# Patient Record
Sex: FEMALE | Race: WHITE
[De-identification: names, ages, dates, MRNs, and addresses within clinical notes are randomized per-mention and may not be internally consistent; named-entity substitution may affect disease eponyms.]

---

## 2018-07-30 ENCOUNTER — HOSPITAL ENCOUNTER (EMERGENCY)
Dept: HOSPITAL 63 - ER | Age: 38
Discharge: HOME | End: 2018-07-30
Payer: OTHER GOVERNMENT

## 2018-07-30 VITALS — WEIGHT: 160 LBS | BODY MASS INDEX: 25.71 KG/M2 | HEIGHT: 66 IN

## 2018-07-30 VITALS — DIASTOLIC BLOOD PRESSURE: 61 MMHG | SYSTOLIC BLOOD PRESSURE: 142 MMHG
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
## 2018-07-30 DIAGNOSIS — S50.02XA: Primary | ICD-10-CM

## 2018-07-30 PROCEDURE — 99284 EMERGENCY DEPT VISIT MOD MDM: CPT

## 2018-07-30 PROCEDURE — 73080 X-RAY EXAM OF ELBOW: CPT

## 2018-07-30 NOTE — ED.ADGEN
Past History


Past Medical History:  No Pertinent History


Past Surgical History:  No Surgical History


Smoking:  Non-smoker


Drug Use:  None





Adult General


Chief Complaint


Chief Complaint


Left elbow injury post-fall





HPI


HPI


Patient tripped over a cat while going up stairs at 6:30 AM this morning 

landing on her left elbow with injury. She notes no other injury. She has 

tenderness at her left elbow though she has intact range of motion.





Review of Systems


Review of Systems





Constitutional: Denies fever or chills 


Eyes: Denies change in visual acuity, redness, or eye pain 


HENT: Denies nasal congestion or sore throat 


Respiratory: Denies cough or shortness of breath 


Cardiovascular: Denies chest pain


GI: Denies abdominal pain, nausea, vomiting, bloody stools or diarrhea 


: Denies dysuria or hematuria 


Musculoskeletal: Denies back pain, with left elbow tenderness, no swelling or 

ecchymosis intact distal neurovascular exam is intact 


Integument: Denies rash or skin lesions 


Neurologic: Denies headache, focal weakness or sensory changes 


Endocrine: Denies polyuria or polydipsia 





All other systems were reviewed and found to be within normal limits, except as 

documented in this note.





Allergies


Allergies





Allergies








Coded Allergies Type Severity Reaction Last Updated Verified


 


  No Known Drug Allergies    18 No











Physical Exam


Physical Exam





Constitutional: Well developed, well nourished, no acute distress, non-toxic 

appearance. 


HENT: Normocephalic, atraumatic, bilateral external ears normal, oropharynx 

moist, no oral exudates, nose normal. 


Eyes: PERRLA, EOMI, conjunctiva normal, no discharge.  


Neck: Normal range of motion, no tenderness, supple, no stridor. 


Cardiovascular:Heart rate regular rhythm, no murmur 


Lungs & Thorax:  Bilateral breath sounds clear to auscultation 


Abdomen: Bowel sounds normal, soft, no tenderness, no masses, no pulsatile 

masses. 


Skin: Warm, dry, no erythema, no rash. 


Back: No tenderness, no CVA tenderness. 


Extremities: With left elbow tenderness along the olecranon, no swelling, no 

ecchymosis, no cyanosis, no clubbing, ROM intact, no edema. 


Neurologic: Alert and oriented X 3, normal motor function, normal sensory 

function, no focal deficits noted. Distal sensation of left upper extremity is 

intact to light touch and position sense. Left hand has intact range of motion 

with normal thumb finger apposition.


Psychologic: Affect normal, judgement normal, mood normal.





Current Patient Data


Vital Signs





 Vital Signs








  Date Time  Temp Pulse Resp B/P (MAP) Pulse Ox O2 Delivery O2 Flow Rate FiO2


 


18 07:20 97.9 74 18  98 Room Air  











EKG


EKG


[]





Radiology/Procedures


Radiology/Procedures


 [facility] [address], [place], [place] [id number] [phone]


 


 IMAGING REPORT





 Signed





PATIENT: SUMAN MOY ACCOUNT: [id number] MRN#: [id number]


: 1980 LOCATION: ER AGE: 38


SEX: F EXAM DT: 18 ACCESSION#: [id number]


STATUS: REG ER ORD. PHYSICIAN: ARLETH NAGEL MD 


REASON: pain/fall


PROCEDURE: ELBOW LEFT 3V





Left elbow, 3 views, 2018:


 


HISTORY: Elbow injury after a fall


 


No fracture or dislocation is identified. There is no radiographic 


evidence of a joint effusion.


 


IMPRESSION: No significant left elbow abnormality is detected.


 


Electronically signed by: Rick Moritz, MD (2018 7:52 AM) [facility]














DICTATED AND SIGNED BY:     MORITZ,RICK S MD


DATE:     18 0752





CC: ARLETH NAGEL MD; PCP,NO ~





Course & Med Decision Making


Course & Med Decision Making


Patient presents with left elbow injury post-fall


DDx-fracture, contusion, dislocation, sprain





Patient was stable in the emergency department. DNVI. Elbow x-ray was 

unremarkable. No fracture or dislocation.


Patient was given her x-ray results and advised follow with her primary doctor. 

Patient given prescription of Motrin for pain





Final Impression


Final Impression


Clinical impression


Left elbow contusion





Dragon Disclaimer


Dragon Disclaimer


This electronic medical record was generated, in whole or in part, using a 

voice recognition dictation system.





Departure


Departure:


Impression:  


 Primary Impression:  


 Left elbow contusion


Disposition:  01 HOME, SELF-CARE


Condition:  STABLE


Referrals:  


RALF MANRIQUEZ MD


Follow-up in 2 days for further evaluation


Patient Instructions:  Elbow Contusion, Easy-to-Read





Additional Instructions:  


Follow-up with your primary doctor for further evaluation. If you develop worse 

pain, swelling, weakness, numbness return to the emergency department.


Scripts


Ibuprofen (IBUPROFEN) 800 Mg Tablet


1 TAB PO TID, #15 TAB


   Prov: ARLETH NAGEL MD         18











ARLETH NAGEL MD 2018 07:24

## 2018-07-30 NOTE — RAD
Left elbow, 3 views, 7/30/2018:

 

HISTORY: Elbow injury after a fall

 

No fracture or dislocation is identified. There is no radiographic 

evidence of a joint effusion.

 

IMPRESSION: No significant left elbow abnormality is detected.

 

Electronically signed by: Rick Moritz, MD (7/30/2018 7:52 AM) [facility]

## 2019-08-30 ENCOUNTER — HOSPITAL ENCOUNTER (EMERGENCY)
Dept: HOSPITAL 63 - ER | Age: 39
Discharge: HOME | End: 2019-08-30
Payer: OTHER GOVERNMENT

## 2019-08-30 VITALS — DIASTOLIC BLOOD PRESSURE: 57 MMHG | SYSTOLIC BLOOD PRESSURE: 106 MMHG

## 2019-08-30 VITALS — WEIGHT: 158 LBS | BODY MASS INDEX: 25.39 KG/M2 | HEIGHT: 66 IN

## 2019-08-30 DIAGNOSIS — K52.9: Primary | ICD-10-CM

## 2019-08-30 LAB
ALBUMIN SERPL-MCNC: 3.5 G/DL (ref 3.4–5)
ALBUMIN/GLOB SERPL: 0.9 {RATIO} (ref 1–1.7)
ALP SERPL-CCNC: 73 U/L (ref 46–116)
ALT SERPL-CCNC: 17 U/L (ref 14–59)
AMORPH SED URNS QL MICRO: PRESENT /HPF
ANION GAP SERPL CALC-SCNC: 6 MMOL/L (ref 6–14)
APTT PPP: YELLOW S
AST SERPL-CCNC: 20 U/L (ref 15–37)
BACTERIA #/AREA URNS HPF: (no result) /HPF
BASOPHILS # BLD AUTO: 0 X10^3/UL (ref 0–0.2)
BASOPHILS NFR BLD: 1 % (ref 0–3)
BILIRUB SERPL-MCNC: 0.4 MG/DL (ref 0.2–1)
BILIRUB UR QL STRIP: (no result)
BUN/CREAT SERPL: 8 (ref 6–20)
CA-I SERPL ISE-MCNC: 7 MG/DL (ref 7–20)
CALCIUM SERPL-MCNC: 8.6 MG/DL (ref 8.5–10.1)
CHLORIDE SERPL-SCNC: 105 MMOL/L (ref 98–107)
CO2 SERPL-SCNC: 27 MMOL/L (ref 21–32)
CREAT SERPL-MCNC: 0.9 MG/DL (ref 0.6–1)
EOSINOPHIL NFR BLD: 0.1 X10^3/UL (ref 0–0.7)
EOSINOPHIL NFR BLD: 1 % (ref 0–3)
ERYTHROCYTE [DISTWIDTH] IN BLOOD BY AUTOMATED COUNT: 12.6 % (ref 11.5–14.5)
FIBRINOGEN PPP-MCNC: (no result) MG/DL
GFR SERPLBLD BASED ON 1.73 SQ M-ARVRAT: 70.1 ML/MIN
GLOBULIN SER-MCNC: 3.8 G/DL (ref 2.2–3.8)
GLUCOSE SERPL-MCNC: 95 MG/DL (ref 70–99)
GLUCOSE UR STRIP-MCNC: (no result) MG/DL
HCT VFR BLD CALC: 41.4 % (ref 36–47)
HGB BLD-MCNC: 14 G/DL (ref 12–15.5)
LYMPHOCYTES # BLD: 1.6 X10^3/UL (ref 1–4.8)
LYMPHOCYTES NFR BLD AUTO: 28 % (ref 24–48)
MCH RBC QN AUTO: 32 PG (ref 25–35)
MCHC RBC AUTO-ENTMCNC: 34 G/DL (ref 31–37)
MCV RBC AUTO: 95 FL (ref 79–100)
MONO #: 0.8 X10^3/UL (ref 0–1.1)
MONOCYTES NFR BLD: 14 % (ref 0–9)
NEUT #: 3.4 X10^3UL (ref 1.8–7.7)
NEUTROPHILS NFR BLD AUTO: 57 % (ref 31–73)
NITRITE UR QL STRIP: (no result)
PLATELET # BLD AUTO: 251 X10^3/UL (ref 140–400)
POTASSIUM SERPL-SCNC: 3.5 MMOL/L (ref 3.5–5.1)
PROT SERPL-MCNC: 7.3 G/DL (ref 6.4–8.2)
RBC # BLD AUTO: 4.38 X10^6/UL (ref 3.5–5.4)
RBC #/AREA URNS HPF: (no result) /HPF (ref 0–2)
SODIUM SERPL-SCNC: 138 MMOL/L (ref 136–145)
SP GR UR STRIP: 1.01
SQUAMOUS #/AREA URNS LPF: (no result) /LPF
UROBILINOGEN UR-MCNC: 0.2 MG/DL
WBC # BLD AUTO: 6 X10^3/UL (ref 4–11)
WBC #/AREA URNS HPF: (no result) /HPF (ref 0–4)

## 2019-08-30 PROCEDURE — 74177 CT ABD & PELVIS W/CONTRAST: CPT

## 2019-08-30 PROCEDURE — 81025 URINE PREGNANCY TEST: CPT

## 2019-08-30 PROCEDURE — 87086 URINE CULTURE/COLONY COUNT: CPT

## 2019-08-30 PROCEDURE — 36415 COLL VENOUS BLD VENIPUNCTURE: CPT

## 2019-08-30 PROCEDURE — 85025 COMPLETE CBC W/AUTO DIFF WBC: CPT

## 2019-08-30 PROCEDURE — 99285 EMERGENCY DEPT VISIT HI MDM: CPT

## 2019-08-30 PROCEDURE — 81001 URINALYSIS AUTO W/SCOPE: CPT

## 2019-08-30 PROCEDURE — 80053 COMPREHEN METABOLIC PANEL: CPT

## 2019-08-30 PROCEDURE — 96374 THER/PROPH/DIAG INJ IV PUSH: CPT

## 2019-08-30 NOTE — PHYS DOC
Past History


Past Medical History:  No Pertinent History


Past Surgical History:  No Surgical History


Smoking:  Non-smoker


Alcohol Use:  None


Drug Use:  None





Adult General


Chief Complaint


Chief Complaint:  NAUSEA/VOMITING/DIARRHEA





HPI


HPI


39-year-old female presents with lower abdominal pain and diarrhea. The patient 

started with diarrhea 4 days ago. She seems to have an episode every time she 

eats or drinks anything. He has had decreased appetite. On the second day, she 

started to have some lower abdominal cramping.  Her stool has not been bloody. 

She has not measured a fever, but has had chills. No history of diverticulosis 

or diverticulitis. 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 

currently, and was born with 4. The patient has had nausea, but no vomiting.





Review of Systems


Review of Systems





Constitutional: Denies fever or chills []


Eyes: Denies change in visual acuity, redness, or eye pain []


HENT: Denies nasal congestion or sore throat []


Respiratory: Denies cough or shortness of breath []


Cardiovascular: No additional information not addressed in HPI []


GI: abdominal pain, nausea, diarrhea.  []


: Denies dysuria or hematuria []


Musculoskeletal: Denies back pain or joint pain []


Integument: Denies rash or skin lesions []


Neurologic: Denies headache, focal weakness or sensory changes []


Endocrine: Denies polyuria or polydipsia []





All other systems were reviewed and found to be within normal limits, except as 

documented in this note.





Current Medications


Current Medications





Current Medications








 Medications


  (Trade)  Dose


 Ordered  [facility]  Start Time


 Stop Time Status Last Admin


Dose Admin


 


 Ondansetron HCl


  (Zofran)  4 mg  1X  ONCE  8/30/19 12:30


 8/30/19 12:31 DC  





 


 Sodium Chloride  1,000 ml @ 


 1,000 mls/hr  1X  ONCE  8/30/19 12:30


 8/30/19 13:29   














Allergies


Allergies





Allergies








Coded Allergies Type Severity Reaction Last Updated Verified


 


  No Known Drug Allergies    7/30/18 No











Physical Exam


Physical Exam





Constitutional: Well developed, well nourished, no acute distress, non-toxic pablo

earance. []


HENT: Normocephalic, atraumatic, bilateral external ears normal, oropharynx 

moist, no oral exudates, nose normal. []


Eyes: PERRLA, EOMI, conjunctiva normal, no discharge. [] 


Neck: Normal range of motion, no tenderness, supple, no stridor. [] 


Cardiovascular:Heart rate regular rhythm, no murmur []


Lungs & Thorax:  Bilateral breath sounds clear to auscultation []


Abdomen: Bowel sounds normal, soft, mild right lower quadrant and left lower 

quadrant tenderness, no masses, no pulsatile masses. [] 


Skin: Warm, dry, no erythema, no rash. [] 


Back: No tenderness, no CVA tenderness. [] 


Extremities: No tenderness, no cyanosis, no clubbing, ROM intact, no edema. [] 


Neurologic: Alert and oriented X 3, normal motor function, normal sensory 

function, no focal deficits noted. []


Psychologic: Affect normal, judgement normal, mood normal. []





EKG


EKG


[]





Radiology/Procedures


Radiology/Procedures


[]


Impressions:


CT ABD PELV W/ IV CONTRST ONLY


 


Indication: Lower abdominal pain.


 


Exposure: One or more of the following individualized dose reduction 


techniques were utilized for this examination:  1. Automated exposure 


control  2. Adjustment of the mA and/or kV according to patient size  3. 


Use of iterative reconstruction technique.


 


Technique: Intravenous contrast was given. No oral contrast per request.


 


Comparison: None


 


FINDINGS:


Partially visualized breast implants. Lung bases are clear. Liver and 


spleen appear unremarkable. Pancreas appears unremarkable. No evidence of 


adrenal mass.


 


Kidneys demonstrate symmetric enhancement. Low-density lesion at the upper


pole the left kidney measures 14 mm and 5 Hounsfield units, most 


compatible with a cyst. There appears to be some scarring or deformity of 


the adjacent left upper pole renal tissue. There is also some low-density 


in the lower pole the right kidney, could represent another small lesion 


such as a cyst with some renal cortical atrophy. No hydronephrosis is 


seen.


 


No calcified gallstone. Aorta is nonaneurysmal. No significant lymph node 


enlargement.


 


No significant small bowel distention. Limited GI tract exam without oral 


contrast. Mild wall thickening involving most of the colon, greatest at 


the descending and sigmoid colon to rectum. No definite paracolonic 


stranding. Mild stool in the colon. The appendix, if present, is not 


clearly visualized.


 


No significant pneumoperitoneum. Mild free pelvic fluid is identified, 


measures 4 Hounsfield units compatible with simple content. Low-density 


lesion in the right adnexa measuring about 3 cm and 8 Hounsfield units, 


compatible with an ovarian cyst. Smaller cysts or follicles in the left 


ovary. No definite urinary bladder abnormality.


 


Degenerative changes at the lumbosacral junction. No acute fracture or 


aggressive bone destruction.


 


IMPRESSION:


1. Mild free pelvic fluid, could be physiologic. Right adnexal lesion 


measuring 3 cm, most likely an ovarian cyst.


2. Low-density lesion at the upper pole of the left kidney measuring 14 


mm, may represent a cyst. There is some renal cortical scarring or atrophy


involving the surrounding left upper pole tissue. There are some similar 


changes at the lower pole of the right kidney. Therefore, nonemergent 


further workup with MR or ultrasound of the kidneys could be of benefit 


for further evaluation.


3. Diffuse colonic wall thickening, greatest at the descending and sigmoid


colon to the rectum. Consider mild colitis, although nondistention could 


contribute to this appearance.


 


Electronically signed by: Roger Singletary MD (8/30/2019 2:19 PM) [facility]














DICTATED AND SIGNED BY:     ROGER SINGLETARY MD


DATE:     08/30/19 1412





CC: MARYBETH JEAN BAPTISTE DO; PCP,UNKNOWN ~





Course & Med Decision Making


Course & Med Decision Making


Pertinent Labs and Imaging studies reviewed. (See chart for details)


The patient's labs are unremarkable. Her urinalysis is unremarkable. She is not 

pregnant. Her CT scan shows possible colitis. There are also incidental findings

 related to her kidneys. See official report for more details. I will go in 

place the patient on Augmentin for 7 days presuming infectious colitis. She is 

stable for discharge at this time.


[]





Dragon Disclaimer


Dragon Disclaimer


This electronic medical record was generated, in whole or in part, using a voice

 recognition dictation system.





Departure


Departure:


Impression:  


   Primary Impression:  


   Colitis


Disposition:  01 HOME, SELF-CARE


Condition:  STABLE


Referrals:  


PCP,UNKNOWN (PCP)


Patient Instructions:  Colitis


Scripts


Amoxicillin/Potassium Clav (AUGMENTIN 875-125 TABLET) 1 Each Tablet


1 TAB PO BID for colitis, #14 TAB


   Prov: MARYBETH JEAN BAPTISTE DO         8/30/19











MARYBETH JEAN BAPTISTE DO                 Aug 30, 2019 12:49

## 2019-08-30 NOTE — RAD
CT ABD PELV W/ IV CONTRST ONLY

 

Indication: Lower abdominal pain.

 

Exposure: One or more of the following individualized dose reduction 

techniques were utilized for this examination:  1. Automated exposure 

control  2. Adjustment of the mA and/or kV according to patient size  3. 

Use of iterative reconstruction technique.

 

Technique: Intravenous contrast was given. No oral contrast per request.

 

Comparison: None

 

FINDINGS:

Partially visualized breast implants. Lung bases are clear. Liver and 

spleen appear unremarkable. Pancreas appears unremarkable. No evidence of 

adrenal mass.

 

Kidneys demonstrate symmetric enhancement. Low-density lesion at the upper

pole the left kidney measures 14 mm and 5 Hounsfield units, most 

compatible with a cyst. There appears to be some scarring or deformity of 

the adjacent left upper pole renal tissue. There is also some low-density 

in the lower pole the right kidney, could represent another small lesion 

such as a cyst with some renal cortical atrophy. No hydronephrosis is 

seen.

 

No calcified gallstone. Aorta is nonaneurysmal. No significant lymph node 

enlargement.

 

No significant small bowel distention. Limited GI tract exam without oral 

contrast. Mild wall thickening involving most of the colon, greatest at 

the descending and sigmoid colon to rectum. No definite paracolonic 

stranding. Mild stool in the colon. The appendix, if present, is not 

clearly visualized.

 

No significant pneumoperitoneum. Mild free pelvic fluid is identified, 

measures 4 Hounsfield units compatible with simple content. Low-density 

lesion in the right adnexa measuring about 3 cm and 8 Hounsfield units, 

compatible with an ovarian cyst. Smaller cysts or follicles in the left 

ovary. No definite urinary bladder abnormality.

 

Degenerative changes at the lumbosacral junction. No acute fracture or 

aggressive bone destruction.

 

IMPRESSION:

1. Mild free pelvic fluid, could be physiologic. Right adnexal lesion 

measuring 3 cm, most likely an ovarian cyst.

2. Low-density lesion at the upper pole of the left kidney measuring 14 

mm, may represent a cyst. There is some renal cortical scarring or atrophy

involving the surrounding left upper pole tissue. There are some similar 

changes at the lower pole of the right kidney. Therefore, nonemergent 

further workup with MR or ultrasound of the kidneys could be of benefit 

for further evaluation.

3. Diffuse colonic wall thickening, greatest at the descending and sigmoid

colon to the rectum. Consider mild colitis, although nondistention could 

contribute to this appearance.

 

Electronically signed by: Kurt Singletary MD (8/30/2019 2:19 PM) [id number]

## 2019-12-15 ENCOUNTER — HOSPITAL ENCOUNTER (EMERGENCY)
Dept: HOSPITAL 63 - ER | Age: 39
Discharge: HOME | End: 2019-12-15
Payer: OTHER GOVERNMENT

## 2019-12-15 VITALS
BODY MASS INDEX: 24.34 KG/M2 | DIASTOLIC BLOOD PRESSURE: 79 MMHG | HEIGHT: 66 IN | SYSTOLIC BLOOD PRESSURE: 126 MMHG | WEIGHT: 151.46 LBS

## 2019-12-15 DIAGNOSIS — R10.31: Primary | ICD-10-CM

## 2019-12-15 DIAGNOSIS — M79.7: ICD-10-CM

## 2019-12-15 DIAGNOSIS — N93.9: ICD-10-CM

## 2019-12-15 DIAGNOSIS — Z90.89: ICD-10-CM

## 2019-12-15 LAB
ALBUMIN SERPL-MCNC: 4 G/DL (ref 3.4–5)
ALBUMIN/GLOB SERPL: 1 {RATIO} (ref 1–1.7)
ALP SERPL-CCNC: 89 U/L (ref 46–116)
ALT SERPL-CCNC: 23 U/L (ref 14–59)
AMPHETAMINE/METHAMPHETAMINE: (no result)
ANION GAP SERPL CALC-SCNC: 8 MMOL/L (ref 6–14)
APTT PPP: YELLOW S
APTT PPP: YELLOW S
AST SERPL-CCNC: 22 U/L (ref 15–37)
BACTERIA #/AREA URNS HPF: 0 /HPF
BARBITURATES UR-MCNC: (no result) UG/ML
BASOPHILS # BLD AUTO: 0.1 X10^3/UL (ref 0–0.2)
BASOPHILS NFR BLD: 0 % (ref 0–3)
BENZODIAZ UR-MCNC: (no result) UG/L
BILIRUB SERPL-MCNC: 0.6 MG/DL (ref 0.2–1)
BILIRUB UR QL STRIP: (no result)
BILIRUB UR QL STRIP: (no result)
BUN/CREAT SERPL: 18 (ref 6–20)
CA-I SERPL ISE-MCNC: 14 MG/DL (ref 7–20)
CALCIUM SERPL-MCNC: 9 MG/DL (ref 8.5–10.1)
CANNABINOIDS UR-MCNC: (no result) UG/L
CHLORIDE SERPL-SCNC: 101 MMOL/L (ref 98–107)
CO2 SERPL-SCNC: 28 MMOL/L (ref 21–32)
COCAINE UR-MCNC: (no result) NG/ML
CREAT SERPL-MCNC: 0.8 MG/DL (ref 0.6–1)
EOSINOPHIL NFR BLD: 0.2 X10^3/UL (ref 0–0.7)
EOSINOPHIL NFR BLD: 1 % (ref 0–3)
ERYTHROCYTE [DISTWIDTH] IN BLOOD BY AUTOMATED COUNT: 12.6 % (ref 11.5–14.5)
FIBRINOGEN PPP-MCNC: CLEAR MG/DL
FIBRINOGEN PPP-MCNC: CLEAR MG/DL
GFR SERPLBLD BASED ON 1.73 SQ M-ARVRAT: 80.3 ML/MIN
GLOBULIN SER-MCNC: 4.1 G/DL (ref 2.2–3.8)
GLUCOSE SERPL-MCNC: 80 MG/DL (ref 70–99)
GLUCOSE UR STRIP-MCNC: (no result) MG/DL
GLUCOSE UR STRIP-MCNC: (no result) MG/DL
HCT VFR BLD CALC: 43 % (ref 36–47)
HGB BLD-MCNC: 14.4 G/DL (ref 12–15.5)
LIPASE: 261 U/L (ref 73–393)
LYMPHOCYTES # BLD: 1.4 X10^3/UL (ref 1–4.8)
LYMPHOCYTES NFR BLD AUTO: 10 % (ref 24–48)
MCH RBC QN AUTO: 31 PG (ref 25–35)
MCHC RBC AUTO-ENTMCNC: 34 G/DL (ref 31–37)
MCV RBC AUTO: 93 FL (ref 79–100)
METHADONE SERPL-MCNC: (no result) NG/ML
MONO #: 1 X10^3/UL (ref 0–1.1)
MONOCYTES NFR BLD: 7 % (ref 0–9)
NEUT #: 12.1 X10^3UL (ref 1.8–7.7)
NEUTROPHILS NFR BLD AUTO: 82 % (ref 31–73)
NITRITE UR QL STRIP: (no result)
NITRITE UR QL STRIP: (no result)
OPIATES UR-MCNC: (no result) NG/ML
PCP SERPL-MCNC: (no result) MG/DL
PLATELET # BLD AUTO: 280 X10^3/UL (ref 140–400)
POTASSIUM SERPL-SCNC: 3.7 MMOL/L (ref 3.5–5.1)
PROT SERPL-MCNC: 8.1 G/DL (ref 6.4–8.2)
RBC # BLD AUTO: 4.64 X10^6/UL (ref 3.5–5.4)
RBC #/AREA URNS HPF: (no result) /HPF (ref 0–2)
SODIUM SERPL-SCNC: 137 MMOL/L (ref 136–145)
SP GR UR STRIP: >=1.03
SP GR UR STRIP: >=1.03
SQUAMOUS #/AREA URNS LPF: (no result) /LPF
UROBILINOGEN UR-MCNC: 0.2 MG/DL
UROBILINOGEN UR-MCNC: 0.2 MG/DL
WBC # BLD AUTO: 14.7 X10^3/UL (ref 4–11)
WBC #/AREA URNS HPF: (no result) /HPF (ref 0–4)

## 2019-12-15 PROCEDURE — 80307 DRUG TEST PRSMV CHEM ANLYZR: CPT

## 2019-12-15 PROCEDURE — 96374 THER/PROPH/DIAG INJ IV PUSH: CPT

## 2019-12-15 PROCEDURE — 85025 COMPLETE CBC W/AUTO DIFF WBC: CPT

## 2019-12-15 PROCEDURE — 83690 ASSAY OF LIPASE: CPT

## 2019-12-15 PROCEDURE — 74176 CT ABD & PELVIS W/O CONTRAST: CPT

## 2019-12-15 PROCEDURE — 81001 URINALYSIS AUTO W/SCOPE: CPT

## 2019-12-15 PROCEDURE — 81025 URINE PREGNANCY TEST: CPT

## 2019-12-15 PROCEDURE — 99285 EMERGENCY DEPT VISIT HI MDM: CPT

## 2019-12-15 PROCEDURE — 80053 COMPREHEN METABOLIC PANEL: CPT

## 2019-12-15 PROCEDURE — 81003 URINALYSIS AUTO W/O SCOPE: CPT

## 2019-12-15 PROCEDURE — 36415 COLL VENOUS BLD VENIPUNCTURE: CPT

## 2019-12-15 NOTE — RAD
Examination: CT of the abdomen pelvis without contrast

 

HISTORY: History of right lower quadrant pain, nausea

 

COMPARISON: 8/30/2019

 

TECHNIQUE: Axial CT images of the abdomen pelvis were performed without 

contrast. Coronal and sagittal reformats are performed.

 

Exposure: One or more of the following individualized dose reduction 

techniques were utilized for this examination:  1. Automated exposure 

control  2. Adjustment of the mA and/or kV according to patient size  3. 

Use of iterative reconstruction technique

 

FINDINGS:

 

 

The bibasilar lungs are clear. No evidence of free air identified in the 

abdomen.

 

The evaluation of the solid organs is limited due to lack of IV contrast. 

The evaluation of bowel is limited due to lack of oral contrast.

 

The visualized noncontrasted liver, spleen, adrenals grossly appears 

unremarkable. Gallbladder is mildly distended.

 

The stomach is mildly distended. The visualized pancreas grossly appears 

unremarkable. The small bowel is nondilated

 

The appendix could not be identified. Feces and gas noted in the colon. 

Urinary bladder is mildly distended. No evidence of intrarenal collecting 

system calculi identified. Mild prominent appearing right ureter without 

obvious ureter calculus, however evaluation is limited due to multiple 

bowel loops which limits evaluation of the distal ureters.

 

No evidence of lytic bony destructive lesion.

 

IMPRESSION:

 

1. Questionable minimal prominence of the right ureter without obvious 

ureteral calculus. Evaluation of the ureters is limited due to multiple 

bowel loops. Urinary tract infection is not completely excluded.

 

2. The appendix is not identified.

 

Electronically signed by: Robe Fuentes MD (12/15/2019 12:12 PM) 

[id number]

## 2019-12-15 NOTE — PHYS DOC
Past History


Past Medical History:  Fibromyalgia, Other


Past Surgical History:  Appendectomy


Smoking:  Non-smoker


Alcohol Use:  None


Drug Use:  None





Adult General


Chief Complaint


Chief Complaint:  ABDOMINAL PAIN





HPI


HPI





Patient is a 39-year-old female presents complaining of right lower quadrant 

abdominal pain that has been present for the past 2 days, it is baseline 

achiness but periods of sharp discomfort. She has been seen and evaluated for 

this twice previously with ultrasounds that did not show any acute process. She 

has had a previous appendectomy as well as kidney removal-she was born with 4 

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 Movement does not seem to make it 

worse or better. No significant relief with 400 mg of ibuprofen. She has had 

some nausea without any vomiting. She notes that she has had vaginal bleeding. 

Her periods are irregular with her most recent one being approximately 2 weeks 

previous. No heavy vaginal bleeding. Part of her current outpatient evaluation 

is for the possibility of a hysterectomy. She denies any diarrhea. Denies any 

recent travel or trauma. She reports ketorolac has improved the discomfort 

previously.[]





Review of Systems


Review of Systems





Constitutional: Denies fever or chills []


Eyes: Denies change in visual acuity, redness, or eye pain []


HENT: Denies nasal congestion or sore throat []


Respiratory: Denies cough or shortness of breath []


Cardiovascular: No chest pain or palpitations[]


GI: The history of present illness[]


: Denies dysuria or hematuria []


Musculoskeletal: Denies back pain or joint pain []


Integument: Denies rash or skin lesions []


Neurologic: Denies headache, focal weakness or sensory changes []


Endocrine: Denies polyuria or polydipsia []





All other systems were reviewed and found to be within normal limits, except as 

documented in this note.





Allergies


Allergies





Allergies








Coded Allergies Type Severity Reaction Last Updated Verified


 


  No Known Drug Allergies    8/30/19 No











Physical Exam


Physical Exam





Constitutional: Well developed, well nourished, no acute distress, non-toxic 

appearance. []


HENT: Normocephalic, atraumatic, bilateral external ears normal, oropharynx 

moist, no oral exudates, nose normal. []


Eyes: PERRLA, EOMI, conjunctiva normal, no discharge. [] 


Neck: Normal range of motion, no tenderness, supple, no stridor. [] 


Cardiovascular:Heart rate regular rhythm, no murmur []


Lungs & Thorax:  Bilateral breath sounds clear to auscultation []


Abdomen: Bowel sounds normal, soft, no tenderness, no masses, no pulsatile 

masses. [] 


Skin: Warm, dry, no erythema, no rash. [] 


Back: No tenderness, no CVA tenderness. [] 


Extremities: No tenderness, no cyanosis, no clubbing, ROM intact, no edema. [] 


Neurologic: Alert and oriented X 3, normal motor function, normal sensory 

function, no focal deficits noted. []


Psychologic: Affect normal, judgement normal, mood normal. []





Current Patient Data


Lab Results





Laboratory Tests








Test


 12/15/19


10:59 12/15/19


11:15 12/15/19


11:42


 


Urine Opiates Screen Neg   


 


Urine Methadone Screen Neg   


 


Urine Barbiturates Neg   


 


Urine Phencyclidine Screen Neg   


 


Urine


Amphetamine/Methamphetamine Pos 


 


 





 


Urine Benzodiazepines Screen Neg   


 


Urine Cocaine Screen Neg   


 


Urine Cannabinoids Screen Neg   


 


Urine Ethyl Alcohol Neg   


 


Bedside Urine HCG, Qualitative  hcg negative  


 


White Blood Count   14.7 x10^3/uL 


 


Red Blood Count   4.64 x10^6/uL 


 


Hemoglobin   14.4 g/dL 


 


Hematocrit   43.0 % 


 


Mean Corpuscular Volume   93 fL 


 


Mean Corpuscular Hemoglobin   31 pg 


 


Mean Corpuscular Hemoglobin


Concent 


 


 34 g/dL 





 


Red Cell Distribution Width   12.6 % 


 


Platelet Count   280 x10^3/uL 


 


Neutrophils (%) (Auto)   82 % 


 


Lymphocytes (%) (Auto)   10 % 


 


Monocytes (%) (Auto)   7 % 


 


Eosinophils (%) (Auto)   1 % 


 


Basophils (%) (Auto)   0 % 


 


Neutrophils # (Auto)   12.1 x10^3uL 


 


Lymphocytes # (Auto)   1.4 x10^3/uL 


 


Monocytes # (Auto)   1.0 x10^3/uL 


 


Eosinophils # (Auto)   0.2 x10^3/uL 


 


Basophils # (Auto)   0.1 x10^3/uL 


 


Sodium Level   137 mmol/L 


 


Potassium Level   3.7 mmol/L 


 


Chloride Level   101 mmol/L 


 


Carbon Dioxide Level   28 mmol/L 


 


Anion Gap   8 


 


Blood Urea Nitrogen   14 mg/dL 


 


Creatinine   0.8 mg/dL 


 


Estimated GFR


(Cockcroft-Gault) 


 


 80.3 





 


BUN/Creatinine Ratio   18 


 


Glucose Level   80 mg/dL 


 


Calcium Level   9.0 mg/dL 


 


Total Bilirubin   0.6 mg/dL 


 


Aspartate Amino Transf


(AST/SGOT) 


 


 22 U/L 





 


Alanine Aminotransferase


(ALT/SGPT) 


 


 23 U/L 





 


Alkaline Phosphatase   89 U/L 


 


Total Protein   8.1 g/dL 


 


Albumin   4.0 g/dL 


 


Albumin/Globulin Ratio   1.0 


 


Lipase   261 U/L 








Current Medications








 Medications


  (Trade)  Dose


 Ordered  Sig/Beto


 Route


 PRN Reason  Start Time


 Stop Time Status Last Admin


Dose Admin


 


 Sodium Chloride  1,000 ml @ 


 1,000 mls/hr  Q1H


 IV


   12/15/19 11:02


 12/15/19 12:01 DC 12/15/19 11:52





 


 Ketorolac


 Tromethamine


  (Toradol 30mg


 Vial)  30 mg  1X  ONCE


 IVP


   12/15/19 11:15


 12/15/19 11:25 DC 12/15/19 11:52





 


 Hyoscyamine


  (Anaspaz)  0.125 mg  1X  ONCE


 PO


   12/15/19 11:15


 12/15/19 11:25 DC 12/15/19 11:52














EKG


EKG


[]





Radiology/Procedures


Radiology/Procedures


PROCEDURE: CT ABDOMEN PELVIS WO CONTRAST





Examination: CT of the abdomen pelvis without contrast


 


HISTORY: History of right lower quadrant pain, nausea


 


COMPARISON: 8/30/2019


 


TECHNIQUE: Axial CT images of the abdomen pelvis were performed without 


contrast. Coronal and sagittal reformats are performed.


 


Exposure: One or more of the following individualized dose reduction 


techniques were utilized for this examination:  1. Automated exposure 


control  2. Adjustment of the mA and/or kV according to patient size  3. 


Use of iterative reconstruction technique


 


FINDINGS:


 


 


The bibasilar lungs are clear. No evidence of free air identified in the 


abdomen.


 


The evaluation of the solid organs is limited due to lack of IV contrast. 


The evaluation of bowel is limited due to lack of oral contrast.


 


The visualized noncontrasted liver, spleen, adrenals grossly appears 


unremarkable. Gallbladder is mildly distended.


 


The stomach is mildly distended. The visualized pancreas grossly appears 


unremarkable. The small bowel is nondilated


 


The appendix could not be identified. Feces and gas noted in the colon. 


Urinary bladder is mildly distended. No evidence of intrarenal collecting 


system calculi identified. Mild prominent appearing right ureter without 


obvious ureter calculus, however evaluation is limited due to multiple 


bowel loops which limits evaluation of the distal ureters.


 


No evidence of lytic bony destructive lesion.


 


IMPRESSION:


 


1. Questionable minimal prominence of the right ureter without obvious 


ureteral calculus. Evaluation of the ureters is limited due to multiple 


bowel loops. Urinary tract infection is not completely excluded.


 


2. The appendix is not identified.[]





Course & Med Decision Making


Course & Med Decision Making


Pertinent Labs and Imaging studies reviewed. (See chart for details)





Emergency department course: Patient arrived, was placed in bed, and tolerated 

exam well. IV access established, she was given IV fluids and pain medicines. 

She was transported to and from radiology with any complications. After return 

of laboratory and imaging findings, these were discussed with the patient voiced

 understanding. She did get some pain control with the medications administered.

 She was discharged in improved condition.





Medical decision making: There is no evidence of an obstruction, perforation, 

kidney stone, urinary tract infection or pyelonephritis. No evidence of 

uncontrollable pain. No evidence of oral intake intolerance. No evidence of 

appendicitis since this has already been surgically removed. No pancreatitis or 

cholecystitis.[]





Dragon Disclaimer


Dragon Disclaimer


This electronic medical record was generated, in whole or in part, using a voice

 recognition dictation system.





Departure


Departure:


Impression:  


   Primary Impression:  


   Right lower quadrant abdominal pain


Disposition:  01 HOME, SELF-CARE


Condition:  IMPROVED


Referrals:  


NON,STAFF (PCP)


Patient Instructions:  Abdominal Pain





Additional Instructions:  


Follow-up with your regular doctor in 2 days. Take medication as prescribed. 

Return to the ER if worsening pain or any other concerns.


Scripts


Meloxicam (MELOXICAM) 7.5 Mg Tablet


7.5 MG PO DAILY for PAIN, #20 TAB


   Prov: ANSHU MACDONALD DO         12/15/19 


Hyoscyamine Sulfate (LEVSIN) 0.125 Mg Tablet


0.125 MG PO QID for abdominal pain/cramping, #30 TAB


   Prov: ANSHU MACDONALD DO         12/15/19











ANSHU MACDONALD DO          Dec 15, 2019 11:22

## 2019-12-17 ENCOUNTER — HOSPITAL ENCOUNTER (EMERGENCY)
Dept: HOSPITAL 63 - ER | Age: 39
Discharge: HOME | End: 2019-12-17
Payer: OTHER GOVERNMENT

## 2019-12-17 VITALS — SYSTOLIC BLOOD PRESSURE: 125 MMHG | DIASTOLIC BLOOD PRESSURE: 75 MMHG

## 2019-12-17 VITALS — HEIGHT: 66 IN | BODY MASS INDEX: 24.38 KG/M2 | WEIGHT: 151.68 LBS

## 2019-12-17 DIAGNOSIS — J11.1: Primary | ICD-10-CM

## 2019-12-17 DIAGNOSIS — M79.7: ICD-10-CM

## 2019-12-17 PROCEDURE — 99284 EMERGENCY DEPT VISIT MOD MDM: CPT

## 2019-12-17 PROCEDURE — 96372 THER/PROPH/DIAG INJ SC/IM: CPT

## 2019-12-17 NOTE — PHYS DOC
Past History


Past Medical History:  Fibromyalgia, Other


Additional Past Medical Histor:  idiopathic hypersomnia,


Past Surgical History:  Appendectomy, Other


Additional Past Surgical Histo:  abdominoplasty


Smoking:  Non-smoker


Alcohol Use:  None


Drug Use:  None





Adult General


Chief Complaint


Chief Complaint:  FLU SYMPTOM





HPI


HPI





Patient is a 39-year-old female presents with a day #2 of flulike symptoms. 

Patient reports nasal congestion, rhinorrhea, bodies, sore throat and inability 

to sleep. Patient was evaluated at local [facility] clinic and diagnosed with flu. 

Tamiflu, albuterol, Mucinex prescribed. Additionally, patient is taking 

ibuprofen. Reports minimal relief of symptoms. Denies headache, neck stiffness, 

rigidity, rash, nausea vomiting or increased shortness of breath. Denies 

wheezing. No history of asthma. No other acute symptoms or complaints. Last 

menstrual period was 7 days ago.[]





Review of Systems


Review of Systems


Review symptoms as per history of present illness. All other review symptoms are

 negative.


All other systems were reviewed and found to be within normal limits, except as 

documented in this note.





Current Medications


Current Medications





Current Medications








 Medications


  (Trade)  Dose


 Ordered  Sig/Beto  Start Time


 Stop Time Status Last Admin


Dose Admin


 


 Ketorolac


 Tromethamine


  (Toradol Im)  60 mg  1X  ONCE  12/17/19 16:30


 12/17/19 16:31 DC 12/17/19 16:22


60 MG


 


 Morphine Sulfate


  (Morphine 10mg


 Syringe)  10 mg  1X  ONCE  12/17/19 16:30


 12/17/19 16:31 DC 12/17/19 16:22


10 MG


 


 Ondansetron HCl


  (Zofran Odt)  4 mg  1X  ONCE  12/17/19 16:30


 12/17/19 16:31 DC 12/17/19 16:22


4 MG











Allergies


Allergies





Allergies








Coded Allergies Type Severity Reaction Last Updated Verified


 


  No Known Drug Allergies    8/30/19 No











Physical Exam


Physical Exam





Constitutional: Well developed, moderate discomfort secondary to discomfort.. []


HENT: Normocephalic, no sinus tenderness, bilateral external ears normal, 

oropharynx, mild erythema, no oral exudates, nose and nose congestion, 

rhinorrhea. []


Eyes: PERRLA, EOMI, conjunctiva normal, no discharge. [] 


Neck: Normal range of motion, no meningismus. [] 


Cardiovascular:Heart rate regular rhythm, no murmur []


Lungs & Thorax:  Bilateral breath sounds clear to auscultation, no wheezing []


Abdomen: Bowel sounds normal, soft, no tenderness. [] 


Skin: Warm, dry, no erythema, no rash. [] 


Back: No tenderness. [] 


Extremities: No tenderness, no edema. [] 


Neurologic: Alert and oriented X 3, normal motor function, normal sensory 

function, no focal deficits noted. []


Psychologic: Affect normal, judgement normal, mood normal. []





Current Patient Data


Vital Signs





                                   Vital Signs








  Date Time  Temp Pulse Resp B/P (MAP) Pulse Ox O2 Delivery O2 Flow Rate FiO2


 


12/17/19 16:22   18  98 Room Air  


 


12/17/19 15:52 99.7 88      











EKG


EKG


[]





Radiology/Procedures


Radiology/Procedures


[]





Course & Med Decision Making


Course & Med Decision Making


Pertinent Labs and Imaging studies reviewed. (See chart for details)





[Flulike illness respiratory compromise. Vital signs stable. Recommend she 

supportive care with rest and PCP follow-up. Morphine, Toradol and Zofran given 

prior to ED departure.]





Dragon Disclaimer


Dragon Disclaimer


This electronic medical record was generated, in whole or in part, using a voice

recognition dictation system.





Departure


Departure:


Impression:  


   Primary Impression:  


   Influenza-like illness


Disposition:  01 HOME, SELF-CARE


Condition:  STABLE


Patient Instructions:  Influenza, Adult, Easy-to-Read





Additional Instructions:  


Increase fluids and take Mucinex D for nasal congestion, 600 mg of ibuprofen 3 

times daily for body aches and fever and Tussionex as needed for cough and sore 

throat. Follow-up with your PCP in 3-5 days if symptoms persist. Return to the 

ED if new or worsening symptoms.


Scripts


Promethazine HCl/Codeine (Prometh-Codein 6.25-10 mg/5 ml) 5 Ml Syrup


5 ML PO PRN Q4-6HRS PRN for cough MDD 30 Milliliter(s), #120 ML 0 Refills


   Prov: MARYBETH DILLON DO         12/17/19 


Guaifenesin/Pseudoephedrne Hcl (MUCINEX D ER 1,200-120 MG TAB) 1 Each Tab.er.12h


1 TAB PO BID for 12 Days, #24 TAB 0 Refills


   Prov: MARYBETH DILLON DO         12/17/19











MARYBETH DILLON DO                   Dec 17, 2019 16:38